# Patient Record
Sex: FEMALE | HISPANIC OR LATINO | Employment: FULL TIME | URBAN - METROPOLITAN AREA
[De-identification: names, ages, dates, MRNs, and addresses within clinical notes are randomized per-mention and may not be internally consistent; named-entity substitution may affect disease eponyms.]

---

## 2022-01-10 ENCOUNTER — OFFICE VISIT (OUTPATIENT)
Dept: URGENT CARE | Facility: CLINIC | Age: 37
End: 2022-01-10
Payer: COMMERCIAL

## 2022-01-10 ENCOUNTER — HOSPITAL ENCOUNTER (OUTPATIENT)
Facility: MEDICAL CENTER | Age: 37
End: 2022-01-10
Attending: PHYSICIAN ASSISTANT
Payer: COMMERCIAL

## 2022-01-10 VITALS
HEART RATE: 82 BPM | RESPIRATION RATE: 16 BRPM | HEIGHT: 63 IN | BODY MASS INDEX: 18.96 KG/M2 | DIASTOLIC BLOOD PRESSURE: 74 MMHG | OXYGEN SATURATION: 98 % | WEIGHT: 107 LBS | SYSTOLIC BLOOD PRESSURE: 120 MMHG | TEMPERATURE: 99 F

## 2022-01-10 DIAGNOSIS — R52 BODY ACHES: ICD-10-CM

## 2022-01-10 PROCEDURE — U0003 INFECTIOUS AGENT DETECTION BY NUCLEIC ACID (DNA OR RNA); SEVERE ACUTE RESPIRATORY SYNDROME CORONAVIRUS 2 (SARS-COV-2) (CORONAVIRUS DISEASE [COVID-19]), AMPLIFIED PROBE TECHNIQUE, MAKING USE OF HIGH THROUGHPUT TECHNOLOGIES AS DESCRIBED BY CMS-2020-01-R: HCPCS

## 2022-01-10 PROCEDURE — U0005 INFEC AGEN DETEC AMPLI PROBE: HCPCS

## 2022-01-10 PROCEDURE — 99203 OFFICE O/P NEW LOW 30 MIN: CPT | Performed by: PHYSICIAN ASSISTANT

## 2022-01-10 RX ORDER — LEVOTHYROXINE SODIUM 0.12 MG/1
TABLET ORAL
COMMUNITY
Start: 2021-05-07

## 2022-01-10 ASSESSMENT — ENCOUNTER SYMPTOMS
CHILLS: 1
SORE THROAT: 1
MYALGIAS: 1
FEVER: 0
BODY ACHES: 1

## 2022-01-10 NOTE — PROGRESS NOTES
"  Subjective:   Jessenia Christine is a 36 y.o. female who presents today with   Chief Complaint   Patient presents with   • Nasal Congestion     dull headache/pressure,chills,bodyaches, sore throat, earache     Generalized Body Aches  This is a new problem. Episode onset: 4 days. The problem occurs constantly. The problem has been unchanged. Associated symptoms include chills, myalgias and a sore throat. Pertinent negatives include no fever. She has tried acetaminophen for the symptoms. The treatment provided mild relief.     I personally donned proper PPE throughout visit today.     PMH:  has no past medical history on file.  MEDS:   Current Outpatient Medications:   •  levothyroxine (LEVOXYL) 125 MCG Tab, LEVOXYL 125 MCG TABS, Disp: , Rfl:   ALLERGIES: No Known Allergies  SURGHX: No past surgical history on file.  SOCHX:  reports that she has never smoked. She has never used smokeless tobacco.  FH: Reviewed with patient, not pertinent to this visit.     Review of Systems   Constitutional: Positive for chills. Negative for fever.   HENT: Positive for sore throat.    Musculoskeletal: Positive for myalgias.      Objective:   /74   Pulse 82   Temp 37.2 °C (99 °F) (Temporal)   Resp 16   Ht 1.6 m (5' 3\")   Wt 48.5 kg (107 lb)   SpO2 98%   BMI 18.95 kg/m²   Physical Exam  Vitals and nursing note reviewed.   Constitutional:       General: She is not in acute distress.     Appearance: Normal appearance. She is well-developed. She is not ill-appearing or toxic-appearing.   HENT:      Head: Normocephalic and atraumatic.      Right Ear: Hearing normal.      Left Ear: Hearing normal.      Mouth/Throat:      Pharynx: No oropharyngeal exudate or posterior oropharyngeal erythema.   Eyes:      Conjunctiva/sclera: Conjunctivae normal.   Cardiovascular:      Rate and Rhythm: Normal rate and regular rhythm.      Heart sounds: Normal heart sounds.   Pulmonary:      Effort: Pulmonary effort is normal.      Breath sounds: " Normal breath sounds. No stridor. No wheezing, rhonchi or rales.   Musculoskeletal:      Comments: Normal movement in all 4 extremities   Skin:     General: Skin is warm and dry.   Neurological:      Mental Status: She is alert.      Coordination: Coordination normal.   Psychiatric:         Mood and Affect: Mood normal.       Assessment/Plan:   Assessment    1. Body aches  - SARS-CoV-2 PCR (24 hour In-House): Collect NP swab in VTM; Future    Other orders  - levothyroxine (LEVOXYL) 125 MCG Tab; LEVOXYL 125 MCG TABS  Symptoms and presentation consistent with viral illness and we will rule out Covid at this time.  Vital signs are stable on exam today.  Discussed CDC guidelines including self isolation at home.   Patient encouraged to get plenty of rest, use OTC tylenol for pain/fever, and drink plenty of fluids.  Differential diagnosis, natural history, supportive care, and indications for immediate follow-up discussed.   Patient given instructions and understanding of medications and treatment.    If not improving in 3-5 days, F/U with PCP or return to  if symptoms worsen.    Patient agreeable to plan.      Please note that this dictation was created using voice recognition software. I have made every reasonable attempt to correct obvious errors, but I expect that there are errors of grammar and possibly content that I did not discover before finalizing the note.    Sonny Davenport PA-C

## 2022-01-11 DIAGNOSIS — R52 BODY ACHES: ICD-10-CM

## 2022-01-11 LAB
COVID ORDER STATUS COVID19: NORMAL
SARS-COV-2 RNA RESP QL NAA+PROBE: NOTDETECTED
SPECIMEN SOURCE: NORMAL

## 2024-04-04 ENCOUNTER — OFFICE VISIT (OUTPATIENT)
Dept: URGENT CARE | Facility: CLINIC | Age: 39
End: 2024-04-04
Payer: MEDICAID

## 2024-04-04 ENCOUNTER — HOSPITAL ENCOUNTER (OUTPATIENT)
Facility: MEDICAL CENTER | Age: 39
End: 2024-04-04
Attending: PHYSICIAN ASSISTANT
Payer: MEDICAID

## 2024-04-04 VITALS
OXYGEN SATURATION: 99 % | SYSTOLIC BLOOD PRESSURE: 132 MMHG | HEIGHT: 63 IN | DIASTOLIC BLOOD PRESSURE: 72 MMHG | BODY MASS INDEX: 18.95 KG/M2 | HEART RATE: 102 BPM | RESPIRATION RATE: 16 BRPM | TEMPERATURE: 97.2 F

## 2024-04-04 DIAGNOSIS — B96.89 BV (BACTERIAL VAGINOSIS): ICD-10-CM

## 2024-04-04 DIAGNOSIS — N94.6 DYSMENORRHEA: ICD-10-CM

## 2024-04-04 DIAGNOSIS — N76.0 BV (BACTERIAL VAGINOSIS): ICD-10-CM

## 2024-04-04 DIAGNOSIS — Z71.1 CONCERN ABOUT STD IN FEMALE WITHOUT DIAGNOSIS: ICD-10-CM

## 2024-04-04 LAB
APPEARANCE UR: CLEAR
BILIRUB UR STRIP-MCNC: NEGATIVE MG/DL
C TRACH DNA GENITAL QL NAA+PROBE: NEGATIVE
CANDIDA DNA VAG QL PROBE+SIG AMP: NEGATIVE
COLOR UR AUTO: YELLOW
G VAGINALIS DNA VAG QL PROBE+SIG AMP: POSITIVE
GLUCOSE UR STRIP.AUTO-MCNC: NEGATIVE MG/DL
KETONES UR STRIP.AUTO-MCNC: NEGATIVE MG/DL
LEUKOCYTE ESTERASE UR QL STRIP.AUTO: NEGATIVE
N GONORRHOEA DNA GENITAL QL NAA+PROBE: NEGATIVE
NITRITE UR QL STRIP.AUTO: NEGATIVE
PH UR STRIP.AUTO: 6 [PH] (ref 5–8)
POCT INT CON NEG: NEGATIVE
POCT INT CON POS: POSITIVE
POCT URINE PREGNANCY TEST: NEGATIVE
PROT UR QL STRIP: NEGATIVE MG/DL
RBC UR QL AUTO: NORMAL
SP GR UR STRIP.AUTO: 1.02
SPECIMEN SOURCE: NORMAL
T VAGINALIS DNA VAG QL PROBE+SIG AMP: NEGATIVE
UROBILINOGEN UR STRIP-MCNC: 0.2 MG/DL

## 2024-04-04 PROCEDURE — 87480 CANDIDA DNA DIR PROBE: CPT

## 2024-04-04 PROCEDURE — 87491 CHLMYD TRACH DNA AMP PROBE: CPT

## 2024-04-04 PROCEDURE — 81025 URINE PREGNANCY TEST: CPT | Performed by: PHYSICIAN ASSISTANT

## 2024-04-04 PROCEDURE — 87591 N.GONORRHOEAE DNA AMP PROB: CPT

## 2024-04-04 PROCEDURE — 87660 TRICHOMONAS VAGIN DIR PROBE: CPT

## 2024-04-04 PROCEDURE — 87510 GARDNER VAG DNA DIR PROBE: CPT

## 2024-04-04 PROCEDURE — 3075F SYST BP GE 130 - 139MM HG: CPT | Performed by: PHYSICIAN ASSISTANT

## 2024-04-04 PROCEDURE — 99214 OFFICE O/P EST MOD 30 MIN: CPT | Mod: 25 | Performed by: PHYSICIAN ASSISTANT

## 2024-04-04 PROCEDURE — 3078F DIAST BP <80 MM HG: CPT | Performed by: PHYSICIAN ASSISTANT

## 2024-04-04 PROCEDURE — 81002 URINALYSIS NONAUTO W/O SCOPE: CPT | Performed by: PHYSICIAN ASSISTANT

## 2024-04-04 RX ORDER — METRONIDAZOLE 500 MG/1
500 TABLET ORAL 2 TIMES DAILY
Qty: 14 TABLET | Refills: 0 | Status: SHIPPED | OUTPATIENT
Start: 2024-04-04 | End: 2024-04-11

## 2024-04-04 ASSESSMENT — ENCOUNTER SYMPTOMS
CONSTIPATION: 0
DIARRHEA: 0
VOMITING: 0
FEVER: 0
NAUSEA: 0
ABDOMINAL PAIN: 0
CHILLS: 0

## 2024-04-04 NOTE — RESULT ENCOUNTER NOTE
Hi Jerry,    Your testing was positive for bacterial vaginosis. I sent in an antibiotic for you. Feel better soon!    Kind regards,  Boo

## 2024-04-04 NOTE — PROGRESS NOTES
"Subjective:   Jessenia Christine is a 39 y.o. female who presents for Abdominal Pain and Other (STI check )        Patient goes by Jerry.    Patient presents with concerns of possible STI.  States that her partner is experiencing burning with urination.  She is not having any vaginal pain or abnormal discharge.  No genital lesions.  Patient states that she is experiencing more painful menstrual cycles.  She is having some diarrhea and nausea during her cycles as well.  Due to her history of Graves' disease her cycles were previously absent or mild, so she feels that she does not have a good baseline for comparison.  Last pelvic exam was 3 years ago-no history of abnormal Paps.  Not currently established with gynecology.  Patient is currently feeling well-no abdominal pain, nausea, vomiting, fevers, chills.        Review of Systems   Constitutional:  Negative for chills and fever.   Gastrointestinal:  Negative for abdominal pain, constipation, diarrhea, nausea and vomiting.   All other systems reviewed and are negative.      PMH:  has no past medical history on file.  MEDS:   Current Outpatient Medications:     levothyroxine (LEVOXYL) 125 MCG Tab, LEVOXYL 125 MCG TABS, Disp: , Rfl:   ALLERGIES:   Allergies   Allergen Reactions    Aspirin     Soybean Allergy Rash     SURGHX: History reviewed. No pertinent surgical history.  SOCHX:  reports that she has never smoked. She has never used smokeless tobacco.  FH: Family history was reviewed, no pertinent findings to report   Objective:   /72   Pulse (!) 102   Temp 36.2 °C (97.2 °F)   Resp 16   Ht 1.6 m (5' 3\")   SpO2 99%   BMI 18.95 kg/m²   Physical Exam  Vitals reviewed.   Constitutional:       General: She is not in acute distress.     Appearance: Normal appearance. She is well-developed. She is not toxic-appearing.   HENT:      Head: Normocephalic and atraumatic.      Right Ear: External ear normal.      Left Ear: External ear normal.      Nose: Nose normal. "   Cardiovascular:      Rate and Rhythm: Normal rate and regular rhythm.      Heart sounds: Normal heart sounds, S1 normal and S2 normal.   Pulmonary:      Effort: Pulmonary effort is normal. No respiratory distress.      Breath sounds: Normal breath sounds. No stridor. No decreased breath sounds, wheezing, rhonchi or rales.   Abdominal:      General: Abdomen is flat.      Tenderness: There is no abdominal tenderness. There is no right CVA tenderness, left CVA tenderness, guarding or rebound.   Skin:     General: Skin is dry.   Neurological:      Comments: Alert and oriented.    Psychiatric:         Speech: Speech normal.         Behavior: Behavior normal.           Assessment/Plan:   1. Dysmenorrhea  - POCT Urinalysis  - POCT Pregnancy  - VAGINAL PATHOGENS DNA PANEL; Future  - Chlamydia/GC, PCR (Genital/Anal swab); Future  - Referral to Gynecology  - HIV AG/AB COMBO ASSAY SCREENING; Future  - T.PALLIDUM AB NANCY (SCREENING)    2. Concern about STD in female without diagnosis  - HIV AG/AB COMBO ASSAY SCREENING; Future  - T.PALLIDUM AB NANCY (SCREENING)    Testing is pending.  I will contact patient via [employer] with results and adjust treatment plan as indicated.  Patient also referred to gynecology for routine medical care.  Return precautions reviewed.

## 2024-09-09 ENCOUNTER — HOSPITAL ENCOUNTER (OUTPATIENT)
Facility: MEDICAL CENTER | Age: 39
End: 2024-09-09
Attending: NURSE PRACTITIONER
Payer: MEDICAID

## 2024-09-09 ENCOUNTER — OFFICE VISIT (OUTPATIENT)
Dept: URGENT CARE | Facility: CLINIC | Age: 39
End: 2024-09-09
Payer: MEDICAID

## 2024-09-09 VITALS
DIASTOLIC BLOOD PRESSURE: 80 MMHG | RESPIRATION RATE: 20 BRPM | WEIGHT: 104 LBS | SYSTOLIC BLOOD PRESSURE: 136 MMHG | OXYGEN SATURATION: 99 % | BODY MASS INDEX: 18.43 KG/M2 | HEART RATE: 90 BPM | HEIGHT: 63 IN | TEMPERATURE: 98 F

## 2024-09-09 DIAGNOSIS — F40.9 FEAR FOR PERSONAL SAFETY: ICD-10-CM

## 2024-09-09 DIAGNOSIS — N89.8 VAGINAL DISCHARGE: ICD-10-CM

## 2024-09-09 DIAGNOSIS — Z00.00 HEALTH CARE MAINTENANCE: ICD-10-CM

## 2024-09-09 DIAGNOSIS — R52 GENERALIZED BODY ACHES: ICD-10-CM

## 2024-09-09 DIAGNOSIS — F22 PARANOIA (HCC): ICD-10-CM

## 2024-09-09 DIAGNOSIS — J45.20 MILD INTERMITTENT ASTHMA WITHOUT COMPLICATION: ICD-10-CM

## 2024-09-09 DIAGNOSIS — Z91.09 ENVIRONMENTAL ALLERGIES: ICD-10-CM

## 2024-09-09 PROCEDURE — 87660 TRICHOMONAS VAGIN DIR PROBE: CPT

## 2024-09-09 PROCEDURE — 87480 CANDIDA DNA DIR PROBE: CPT

## 2024-09-09 PROCEDURE — 3075F SYST BP GE 130 - 139MM HG: CPT | Performed by: NURSE PRACTITIONER

## 2024-09-09 PROCEDURE — 3079F DIAST BP 80-89 MM HG: CPT | Performed by: NURSE PRACTITIONER

## 2024-09-09 PROCEDURE — 87510 GARDNER VAG DNA DIR PROBE: CPT

## 2024-09-09 PROCEDURE — 99214 OFFICE O/P EST MOD 30 MIN: CPT | Performed by: NURSE PRACTITIONER

## 2024-09-09 RX ORDER — ALBUTEROL SULFATE 90 UG/1
2 INHALANT RESPIRATORY (INHALATION) EVERY 6 HOURS PRN
Qty: 8.5 G | Refills: 0 | Status: SHIPPED | OUTPATIENT
Start: 2024-09-09

## 2024-09-09 NOTE — PROGRESS NOTES
"Verbal consent was acquired by the patient to use [employer] ambient listening note generation during this visit     Date: 09/09/24        Chief Complaint   Patient presents with    Chills     X 5 days, fever, body aches, pt states she has not been able to eat since Wednesday           History of Present Illness  The patient is a 39-year-old female who presents to the clinic with nausea, vomiting, inability to eat for the past several days, chills, and body aches.    She reports feeling unwell after outdoor activities, experiencing symptoms such as body aches and chills. She does not have a sore throat or cough but mentions a few asthma episodes and excessive sweating. She recalls a week-long illness following a 10-mile walk on 06/04/2024. She has previously consulted an allergist and is considering another visit due to her work environment. She does not currently have a primary care physician or an inhaler at home. She has been using marijuana for symptom management.  She believes her symptoms are due to sun exposure.     She has been in a relationship for 5.5 years and became sexually active in 2020. She was diagnosed with bacterial vaginosis (BV) twice, which she believes was transmitted by her partner. Despite her partner's negative test results, she experienced a \"spike\" in her BV levels. She reports no vaginal itching, irritation, or change in discharge but notes a slight change in the color of her menstrual blood.    She has not been eating much recently but reports no nausea, vomiting, or diarrhea.  She states that she has not eating much because \"what is food \".  This has been a struggle with her for quite some time finding types of food that can fuel her body but does not cause a reaction.  She has been eating some cheese which has been helpful.    She does note previous workplace stress.  She states everywhere she goes that she encounters predators.  She states that every workplace she has worked she " encounters predators.  She states even just walking down the street she will encounter predators she reports these predators are sexual partners.  She states her significant other notes the pressures as well.  She denies any history of sexual abuse.  She used to have a therapist but unfortunately the therapist left the practice quite sometime ago.  She states that her mental health is great currently.  She states that marijuana has been helping her with her symptoms.   She has a history of thyroid issues and continues to take her thyroid medication.          ROS:    No severe shortness of breath   No Cardiac like chest pain, as discussed   As otherwise stated in HPI    Medical/SX/ Social History:  Reviewed per chart    Pertinent Medications:    Current Outpatient Medications on File Prior to Visit   Medication Sig Dispense Refill    levothyroxine (LEVOXYL) 125 MCG Tab LEVOXYL 125 MCG TABS       No current facility-administered medications on file prior to visit.        Allergies:    Aspirin and Soybean allergy     Problem list, medications, and allergies reviewed by myself today in Epic     Physical Exam:    Vitals:    09/09/24 1134   BP: 136/80   Pulse: 90   Resp: 20   Temp: 36.7 °C (98 °F)   SpO2: 99%             Physical Exam  Constitutional:       General: She is not in acute distress.     Appearance: Normal appearance. She is well-developed and normal weight. She is not ill-appearing, toxic-appearing or diaphoretic.   HENT:      Head: Normocephalic and atraumatic.      Nose: Nose normal.      Mouth/Throat:      Mouth: Mucous membranes are moist.   Eyes:      General: Lids are normal. Gaze aligned appropriately. No allergic shiner or scleral icterus.     Extraocular Movements: Extraocular movements intact.      Conjunctiva/sclera: Conjunctivae normal.   Cardiovascular:      Rate and Rhythm: Normal rate and regular rhythm.      Pulses:           Radial pulses are 2+ on the right side and 2+ on the left side.       Heart sounds: Normal heart sounds.   Pulmonary:      Effort: Pulmonary effort is normal.      Breath sounds: Normal breath sounds and air entry. No decreased breath sounds, wheezing, rhonchi or rales.   Abdominal:      General: Abdomen is flat. Bowel sounds are normal.      Palpations: Abdomen is soft.      Tenderness: There is no abdominal tenderness.   Musculoskeletal:      Right lower leg: No edema.      Left lower leg: No edema.   Skin:     General: Skin is warm.      Capillary Refill: Capillary refill takes less than 2 seconds.      Coloration: Skin is not cyanotic or pale.      Comments: Scabbing on face scattered    Neurological:      Mental Status: She is alert and oriented to person, place, and time.      Gait: Gait is intact.   Psychiatric:         Attention and Perception: Attention normal.         Mood and Affect: Mood is elated.         Speech: Speech normal.         Behavior: Behavior is hyperactive. Behavior is cooperative.         Thought Content: Thought content is paranoid. Thought content does not include homicidal or suicidal ideation.      Comments: Grandiose mood             Diagnostics:    Vaginal pathogen swab pending    Medical Decision making and plan :  I personally reviewed prior external notes and test results pertinent to today's visit. Pt is clinically stable at today's acute urgent care visit.  Patient appears nontoxic with no acute distress noted. Appropriate for outpatient care at this time.    Pleasant 39 y.o. female presented clinic with:     Assessment & Plan  1. Bacterial Vaginosis.  She reports a history of bacterial vaginosis with a recent spike in symptoms. A vaginal swab test will be conducted today to confirm the diagnosis. Education was provided regarding the nature of bacterial vaginosis, emphasizing that it is not a sexually transmitted infection and can be caused by factors such as swimming or sexual activity.  Advises an overgrowth of normal bacteria in the vaginal  area pending the results of the swab test, appropriate antibiotic therapy may be initiated.    2. Asthma.  She has a history of asthma and reports recent episodes exacerbated by outdoor activities and smoke exposure. An inhaler will be prescribed to manage her asthma symptoms. She is advised to stay out of the sun and avoid smoke exposure. Hydration and rest are recommended.    3.  Decreased appetite  She reports nausea, vomiting, and inability to eat for the past several days. She is advised to hydrate at home and rest. Small, frequent meals and electrolyte replacement drinks are recommended.  No signs of acute abdomen.  Likely psychogenic    4. Thyroid Disorder.  She has a history of thyroid disorder and reports a recent lapse in medication due to losing her endocrinologist. She is advised to follow up with a new primary care physician and endocrinologist to manage her thyroid condition.  She has currently been back on her medication for 2 weeks    5. Mental Health.  She reports a history of therapy for work-related stress and safety concerns. A referral to a new therapist will be made to address her ongoing mental health needs.  While patient is happy and elated during clinic visit do have concerns for possible paranoia versus delusions versus phobia of predators.  She is not at risk to harming herself or others.     6. Allergy Referral.  She has a history of allergies and reports symptoms exacerbated by outdoor activities. A referral to an allergist will be made for further evaluation and management.        Shared decision-making was utilized with patient for treatment plan. Medication discussed included indication for use and the potential benefits and side effects. Education was provided regarding the aforementioned assessments.  Differential Diagnosis, natural history, and supportive care discussed. All of the patient's questions were answered to their satisfaction at the time of discharge. Patient was  encouraged to monitor symptoms closely. Those signs and symptoms which would warrant concern and mandate seeking a higher level of service through the emergency department discussed at length.  Patient stated agreement and understanding of this plan of care.    Disposition:  Home in stable condition       35 min times spent with patient including documenting.     Voice Recognition Disclaimer:  Portions of this document were created using voice recognition software and [employer] technology provided by [employer]. The software does have a chance of producing errors of grammar and possibly content. I have made every reasonable attempt to correct obvious errors, but there may be errors of grammar and possibly content that I did not discover before finalizing the  documentation.    IVAN Baker.

## 2024-09-10 LAB
CANDIDA DNA VAG QL PROBE+SIG AMP: NEGATIVE
G VAGINALIS DNA VAG QL PROBE+SIG AMP: NEGATIVE
T VAGINALIS DNA VAG QL PROBE+SIG AMP: NEGATIVE

## 2024-12-10 ENCOUNTER — TELEPHONE (OUTPATIENT)
Dept: OBGYN | Facility: CLINIC | Age: 39
End: 2024-12-10
Payer: MEDICAID

## 2025-02-12 ENCOUNTER — APPOINTMENT (OUTPATIENT)
Dept: RADIOLOGY | Facility: IMAGING CENTER | Age: 40
End: 2025-02-12
Payer: COMMERCIAL

## 2025-02-12 ENCOUNTER — OCCUPATIONAL MEDICINE (OUTPATIENT)
Dept: URGENT CARE | Facility: CLINIC | Age: 40
End: 2025-02-12
Payer: COMMERCIAL

## 2025-02-12 VITALS
SYSTOLIC BLOOD PRESSURE: 126 MMHG | WEIGHT: 103 LBS | HEART RATE: 63 BPM | TEMPERATURE: 97.7 F | HEIGHT: 63 IN | DIASTOLIC BLOOD PRESSURE: 72 MMHG | BODY MASS INDEX: 18.25 KG/M2 | RESPIRATION RATE: 18 BRPM | OXYGEN SATURATION: 99 %

## 2025-02-12 DIAGNOSIS — S87.82XA CRUSHING INJURY OF LEFT LOWER LEG, INITIAL ENCOUNTER: ICD-10-CM

## 2025-02-12 DIAGNOSIS — S80.12XA CONTUSION OF LEFT LOWER LEG, INITIAL ENCOUNTER: ICD-10-CM

## 2025-02-12 PROCEDURE — 73590 X-RAY EXAM OF LOWER LEG: CPT | Mod: TC,LT | Performed by: RADIOLOGY

## 2025-02-12 PROCEDURE — 3078F DIAST BP <80 MM HG: CPT

## 2025-02-12 PROCEDURE — 99213 OFFICE O/P EST LOW 20 MIN: CPT

## 2025-02-12 PROCEDURE — 3074F SYST BP LT 130 MM HG: CPT

## 2025-02-12 NOTE — LETTER
PHYSICIAN’S AND CHIROPRACTIC PHYSICIAN'S   PROGRESS REPORT   CERTIFICATION OF DISABILITY Claim Number:     Social Security Number:    Patient’s Name: Jessenia Christine Date of Injury: 2/10/2025   Employer: [employer] Name of MCO (if applicable):      Patient’s Job Description/Occupation:        Previous Injuries/Diseases/Surgeries Contributing to the Condition:  Denies      Diagnosis: (S87.82XA) Crushing injury of left lower leg, initial encounter  (S80.12XA) Contusion of left lower leg, initial encounter      Related to the Industrial Injury? 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  She has had pain with ambulation, she states she has been unable to walk.  She reports swelling, bruising.  Denies weakness, numbness/tingling sensation, reduced range of motion.  Denies pain in knee, ankle. 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 She also reports she has a history of requiring extensive periods of time to recover from bruising and injuries. Denies second job.       Objective Medical Findings: Physical Exam  Vitals and nursing note reviewed.   Constitutional:       General: She is not in acute distress.     Appearance: Normal appearance. She is not ill-appearing or diaphoretic.   HENT:      Head: Normocephalic.   Cardiovascular:      Rate and Rhythm: Normal rate and regular rhythm.      Heart sounds: Normal heart sounds.   Pulmonary:      Effort: Pulmonary effort is normal.      Breath sounds: Normal breath sounds.   Musculoskeletal:         General: Tenderness and signs of injury present. No swelling or deformity.      Right knee: Normal.      Left knee: Normal.       Right lower leg: Normal.      Left lower leg: Swelling, tenderness (mid tibial tenderness and ecchymosis present) and bony tenderness present. No deformity or lacerations. No edema.      Right ankle: Normal.      Left ankle: Normal.      Right foot: Normal.      Left foot: Normal capillary refill. Normal pulse.        Legs:     Skin:     General: Skin is warm and dry.      Capillary Refill: Capillary refill takes less than 2 seconds.      Findings: Bruising present.   Neurological:      Mental Status: She is alert and oriented to person, place, and time.      Gait: Gait abnormal (Sitting in WC).          None - Discharged                         Stable  No                 Ratable  No        Generally Improved                         Condition Worsened                  Condition Same  May Have Suffered a Permanent Disability No     Treatment Plan:    D39 and C4 completed today.  Per radiology impression, no evidence of fracture or dislocation is present.  Recommend rest, elevation, application of ice packs, NSAID/time per package instructions.  Patient likely contused the anterior lower portion of her leg.   Work restrictions: No pushing, pulling, lifting, carrying of objects weighing more than 10 pounds with the left leg.  Follow-up: 1 week, anticipate MMI         No Change in Therapy                  PT/OT Prescribed                      Medication May be Used While Working        Case Management                          PT/OT Discontinued    Consultation    Further Diagnostic Studies:    Prescription(s)                 Released to FULL DUTY /No Restrictions on (Date):       Certified TOTALLY TEMPORARILY DISABLED (Indicate Dates) From:   To:    X  Released to RESTRICTED/Modified Duty on (Date): From: 2/12/2025 To: 2/19/2025  Restrictions Are:         No Sitting    No Standing    No Pulling Other: No pushing, pulling, lifting, carrying of objects more than 10 pounds with the left leg       No Bending at Waist      No Stooping     No Lifting        No Carrying     No Walking Lifting Restricted to (lbs.):          No Pushing        No Climbing     No Reaching Above Shoulders       Date of Next Visit:   Date of this Exam: 2/12/2025 Physician/Chiropractic Physician Name: JOHN Sandy Physician/Chiropractic Physician Signature:  Tyrese Carmen DO [facility]:  [address] [place] [address], [address] [place], [place] [address] - Telephone [phone] [place]:  [address] [place] [address], [address] [place], [place] [address] - Telephone [phone]    [email or web address]  D-39 (Rev. 10/24)

## 2025-02-12 NOTE — LETTER
"    EMPLOYEE’S CLAIM FOR COMPENSATION/ REPORT OF INITIAL TREATMENT  FORM C-4  PLEASE TYPE OR PRINT    EMPLOYEE’S CLAIM - PROVIDE ALL INFORMATION REQUESTED   First Name                    AMRIT Ruelas                  Last Name  Orvis Birthdate                    1985                Sex  Female Claim Number (Insurer’s Use Only)     Home Address  [address] Age  39 y.o. Height  1.6 m (5' 3\") Weight  46.7 kg (103 lb) Social Security Number     [facility] Zip  [address] Telephone  There are no phone numbers on file.   Mailing Address  [address] [facility] Zip  [address] Primary Language Spoken  English    INSURER   THIRD-PARTY   [employer]   Employee's Occupation (Job Title) When Injury or Occupational Disease Occurred      Employer's Name/Company Name   [employer]  Telephone      Office Mail Address (Number and Street)       Date of Injury (if applicable) 2/10/2025               Hours Injury (if applicable)  1:30 PM Date Employer Notified  2/10/2025 Last Day of Work after Injury or Occupational Disease  2/10/2025 Supervisor to Whom Injury Reported  Donaldo   Address or Location of Accident (if applicable)  Work [1]   What were you doing at the time of accident? (if applicable)  pushing round trash cAn on maxi (assembly of trash can)    How did this injury or occupational disease occur? (Be specific and answer in detail. Use additional sheet if necessary)  while pushing the trAsh cAN on wheels, trash cAN fell off wheels. The top of trash cAN hit my shin. [employer] twice   If you believe that you have an occupational disease, when did you first have knowledge of the disability and its relationship to your employment?  N/A Witnesses to the Accident (if applicable)  none      Nature of Injury or Occupational Disease  Contusion  Part(s) of Body Injured or " Affected  Lower Leg (L) N/A N/A    I CERTIFY THAT THE ABOVE IS TRUE AND CORRECT TO T HE BEST OF MY KNOWLEDGE AND THAT I HAVE PROVIDED THIS INFORMATION IN ORDER TO OBTAIN THE BENEFITS OF [place]CC[place], OR [place]).  I HEREBY AUTHORIZE ANY PHYSICIAN, CHIROPRACTOR, SURGEON, PRACTITIONER OR ANY OTHER PERSON, ANY HOSPITAL, INCLUDING [facility] OR [facility], ANY  MEDICAL SERVICE ORGANIZATION, ANY INSURANCE COMPANY, OR OTHER INSTITUTION OR ORGANIZATION TO RELEASE TO EACH OTHER, ANY MEDICAL OR OTHER INFORMATION, INCLUDING BENEFITS PAID OR PAYABLE, PERTINENT TO THIS INJURY OR DISEASE, EXCEPT INFORMATION RELATIVE TO DIAGNOSIS, TREATMENT AND/OR COUNSELING FOR AIDS, PSYCHOLOGICAL CONDITIONS, ALCOHOL OR CONTROLLED SUBSTANCES, FOR WHICH I MUST GIVE SPECIFIC AUTHORIZATION.  A PHOTOSTAT OF THIS AUTHORIZATION SHALL BE VALID AS THE ORIGINAL.     Date 2/12/25   Place [facility] Employee’s Original or  *Electronic Signature   THIS REPORT MUST BE COMPLETED AND MAILED WITHIN 3 WORKING DAYS OF TREATMENT   Place  [facility]    Name of Facility  [facility]   Date 2/12/2025 Diagnosis and Description of Injury or Occupational Disease  (S87.82XA) Crushing injury of left lower leg, initial encounter  (S80.12XA) Contusion of left lower leg, initial encounter  Diagnoses of Crushing injury of left lower leg, initial encounter and Contusion of left lower leg, initial encounter were pertinent to this visit. Is there evidence that the injured employee was under the influence of alcohol and/or another controlled substance at the time of accident?  []No  [] Yes (if yes, please explain)   Hour 6:10 PM  No   Treatment: Recommend rest, elevation, application of ice packs, NSAID/time per package instructions.  Patient likely contused the anterior lower portion of her leg.     Have you advised the patient to remain off work five days or more?    [] Yes  [] No        No           If yes, indicate dates: From   To      If no, is the injured employee capable of: [] full duty No   [] modified duty Yes  If yes to modified duty, specify any limitations / restrictions:   No pushing, pulling, lifting, carrying of objects weighing more than 10 pounds with the left leg   X-Ray Findings: Negative    From information given by the employee, together with medical evidence, can you directly connect this injury or occupational disease as job incurred?  []Yes   [] No Yes    Is additional medical care by a physician indicated? []Yes [] No  Yes    Do you know of any previous injury or disease contributing to this condition or occupational disease? []Yes [] No (Explain if yes)                          No   Date  2/12/2025 Print Health Care Provider’s Name  JOHN Sandy I certify that the employer’s copy of  this form was delivered to the employer on:   Address  [address] [place] [address] INSURER'S USE ONLY                       [facility]  [id number] Provider’s Tax ID Number  [id number]   Telephone  Dept: [phone]    Health Care Provider’s Original or Electronic Signature  e-SignPEHARRIET DAVIS Degree (MD,DO, DC,PA-C,APRN)  APRN  Choose (if applicable)      ORIGINAL - TREATING HEALTHCARE PROVIDER PAGE 2 - INSURER/TPA PAGE 3 - EMPLOYER PAGE 4 - EMPLOYEE             Form C-4 (rev.08/23)

## 2025-02-13 NOTE — PROGRESS NOTES
Subjective:   Jessenia Christine is a 39 y.o. 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  She has had pain with ambulation, she states she has been unable to walk.  She reports swelling, bruising.  Denies weakness, numbness/tingling sensation, reduced range of motion.  Denies pain in knee, ankle. 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 She also reports she has a history of requiring extensive periods of time to recover from bruising and injuries. Denies second job.     ROS As above in HPI    Medications:    Current Outpatient Medications on File Prior to Visit   Medication Sig Dispense Refill    albuterol 108 (90 Base) MCG/ACT Aero Soln inhalation aerosol Inhale 2 Puffs every 6 hours as needed for Shortness of Breath. 8.5 g 0    levothyroxine (LEVOXYL) 125 MCG Tab LEVOXYL 125 MCG TABS       No current facility-administered medications on file prior to visit.        Allergies:   Bee venom, Aspirin, Ibuprofen, Pineapple, and Soybean allergy    Problem List:   Patient Active Problem List   Diagnosis    Allergic rhinitis    Asbestos exposure    Asthma    Congenital von Willebrand's disease type I (HCC)    PMS (premenstrual syndrome)    Postablative hypothyroidism    S/P thyroidectomy        Surgical History:  No past surgical history on file.    Past Social Hx:   Social History     Tobacco Use    Smoking status: Never    Smokeless tobacco: Never   Vaping Use    Vaping status: Never Used   Substance Use Topics    Alcohol use: Never    Drug use: Never     "      Problem list, medications, and allergies reviewed by myself today in Epic.     Objective:     /72   Pulse 63   Temp 36.5 °C (97.7 °F) (Temporal)   Resp 18   Ht 1.6 m (5' 3\")   Wt 46.7 kg (103 lb)   SpO2 99%   BMI 18.25 kg/m²     Physical Exam  Vitals and nursing note reviewed.   Constitutional:       General: She is not in acute distress.     Appearance: Normal appearance. She is not ill-appearing or diaphoretic.   HENT:      Head: Normocephalic.   Cardiovascular:      Rate and Rhythm: Normal rate and regular rhythm.      Heart sounds: Normal heart sounds.   Pulmonary:      Effort: Pulmonary effort is normal.      Breath sounds: Normal breath sounds.   Musculoskeletal:         General: Tenderness and signs of injury present. No swelling or deformity.      Right knee: Normal.      Left knee: Normal.      Right lower leg: Normal.      Left lower leg: Swelling, tenderness (mid tibial tenderness and ecchymosis present) and bony tenderness present. No deformity or lacerations. No edema.      Right ankle: Normal.      Left ankle: Normal.      Right foot: Normal.      Left foot: Normal capillary refill. Normal pulse.        Legs:    Skin:     General: Skin is warm and dry.      Capillary Refill: Capillary refill takes less than 2 seconds.      Findings: Bruising present.   Neurological:      Mental Status: She is alert and oriented to person, place, and time.      Gait: Gait abnormal (Sitting in WC).         Assessment/Plan:     DX-TIBIA AND FIBULA LEFT 02/12/2025    Narrative  2/12/2025 6:59 PM    HISTORY/REASON FOR EXAM:  Work related injury, had a trash can, approx wt 100 lb, fall onto her anterior lower leg, unable to walk in WC      TECHNIQUE/EXAM DESCRIPTION AND NUMBER OF VIEWS:  2 views of the LEFT tibia and fibula.    COMPARISON:  None    FINDINGS:  Bone mineralization is normal.  There is no evidence of fracture or dislocation.  Soft tissues are normal.    Impression  No evidence of fracture or " dislocation.      Diagnosis and associated orders:   1. Crushing injury of left lower leg, initial encounter  - DX-TIBIA AND FIBULA LEFT; Future    2. Contusion of left lower leg, initial encounter        Comments/MDM:       D39 and C4 completed today.  Per radiology impression, no evidence of fracture or dislocation is present.  Recommend rest, elevation, application of ice packs, NSAID/time per package instructions.  Patient likely contused the anterior lower portion of her leg.   Work restrictions: No pushing, pulling, lifting, carrying of objects weighing more than 10 pounds with the left leg.  Follow-up: 1 week, anticipate MMI       Return to clinic or go to ED if symptoms worsen or persist. Indications for ED discussed at length. Patient/Parent/Guardian voices understanding. Follow-up with your primary care provider in 3-5 days. Red flag symptoms discussed. All side effects of medication discussed including allergic response, GI upset, tendon injury, rash, sedation etc.    Please note that this dictation was created using voice recognition software. I have made a reasonable attempt to correct obvious errors, but I expect that there are errors of grammar and possibly content that I did not discover before finalizing the note.    This note was electronically signed by HALLEY Stubbs

## 2025-02-19 ENCOUNTER — OCCUPATIONAL MEDICINE (OUTPATIENT)
Dept: OCCUPATIONAL MEDICINE | Facility: CLINIC | Age: 40
End: 2025-02-19
Payer: COMMERCIAL

## 2025-02-19 VITALS
BODY MASS INDEX: 18.25 KG/M2 | HEART RATE: 72 BPM | DIASTOLIC BLOOD PRESSURE: 90 MMHG | SYSTOLIC BLOOD PRESSURE: 136 MMHG | TEMPERATURE: 98 F | WEIGHT: 103 LBS | RESPIRATION RATE: 14 BRPM | OXYGEN SATURATION: 100 % | HEIGHT: 63 IN

## 2025-02-19 DIAGNOSIS — S87.82XD CRUSHING INJURY OF LEFT LOWER LEG, SUBSEQUENT ENCOUNTER: ICD-10-CM

## 2025-02-19 PROCEDURE — 99213 OFFICE O/P EST LOW 20 MIN: CPT | Performed by: NURSE PRACTITIONER

## 2025-02-19 ASSESSMENT — PAIN SCALES - GENERAL: PAINLEVEL_OUTOF10: 4=SLIGHT-MODERATE PAIN

## 2025-02-19 NOTE — LETTER
PHYSICIAN’S AND CHIROPRACTIC PHYSICIAN'S   PROGRESS REPORT   CERTIFICATION OF DISABILITY Claim Number:     Social Security Number:    Patient’s Name: Jessenia Christine Date of Injury: 2/10/2025   Employer:  [employer] Name of MCO (if applicable):      Patient’s Job Description/Occupation:        Previous Injuries/Diseases/Surgeries Contributing to the Condition:         Diagnosis: (S87.82XD) Crushing injury of left lower leg, subsequent encounter      Related to the Industrial Injury? 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         Objective Medical Findings: Left lower leg: No  swelling. Positive for tenderness to the mid tibial and faint ecchymosis present and bony tenderness present. No deformity or lacerations. No wounds, signs and symptoms of infection, or erythema.  Distal neurovascular sensation and strength intact.  No gait abnormalities noted.  DTRs 2+.         None - Discharged                         Stable  Yes                 Ratable  No     X   Generally Improved                         Condition Worsened               X   Condition Same  May Have Suffered a Permanent Disability No     Treatment Plan:    Follow-up in 1 week, anticipate MMI at this time  Continue with OTC Tylenol/ibuprofen, ice/heat application, OTC topical ointment of your choosing, and gentle range of motion stretching as tolerated  Return to clinic sooner if needed for further evaluation and management of new or worsening symptoms         No Change in Therapy                  PT/OT Prescribed                      Medication May be Used While Working        Case Management                          PT/OT Discontinued    Consultation    Further Diagnostic Studies:    Prescription(s)                 Released to FULL DUTY /No  Restrictions on (Date):       Certified TOTALLY TEMPORARILY DISABLED (Indicate Dates) From:   To:    X  Released to RESTRICTED/Modified Duty on (Date): From: 2/19/2025 To:  2/26/2025  Restrictions Are:  Temporary      No Sitting    No Standing    No Pulling Other: No pushing, pulling, lifting, carrying of objects weighing more than 10 pounds with the left leg.       No Bending at Waist     No Stooping     No Lifting        No Carrying     No Walking Lifting Restricted to (lbs.):  < or = to 10 pounds       No Pushing        No Climbing     No Reaching Above Shoulders       Date of Next Visit:   2/26/25 AT 11:15 AM Date of this Exam: 2/19/2025 Physician/Chiropractic Physician Name: JOHN Caba Physician/Chiropractic Physician Signature:  Tyrese Carmen DO MPH     [place]:  [address] [place] [address], [address] [place], [place] [address] - Telephone [phone] [place]:  [address] [place] [address], [address] [place], [place] [address] - Telephone [phone]    [email or web address]  D-39 (Rev. 10/24)

## 2025-02-19 NOTE — PROGRESS NOTES
Subjective:     Jessenia Christine is a 39 y.o. 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        Patient states that she is starting to have some improvement of symptoms.  She occasionally has discomfort when she is walking for long dates of time.  She states initially she was unable to do any weightbearing without severe pain.  She has no numbness, tingling, or weakness in the joint.  She takes ibuprofen if needed for her symptoms.  She has been doing stretching exercises.  She is not as consistent with ice, heat, or OTC ointments because she does not like to use them but when she does it helps.  She has not returned to work since the incident.  Anticipate MMI at this next visit.  Plan of care discussed with patient.    ROS: All systems were reviewed on intake form, form was reviewed and signed. See scanned documents in media. Pertinent positives and negatives included in HPI.    PMH: No pertinent past medical history to this problem  MEDS: Medications were reviewed in Epic  ALLERGIES:   Allergies   Allergen Reactions    Bee Venom Anaphylaxis    Aspirin     Ibuprofen Unspecified     Other Reaction(s) from [facility]: thin blood    Pineapple Unspecified     Tongue swells with pineapple    Soybean Allergy Rash     SOCHX: Works as staff at [employer]   FH: No pertinent family history to this problem       Objective:     There were no vitals taken for this visit.    [unfilled]    Left lower leg: No  swelling. Positive for tenderness to the mid tibial and faint ecchymosis present and bony tenderness present. No deformity or lacerations. No wounds, signs and symptoms of infection, or erythema.  Distal neurovascular sensation and strength intact.  No gait abnormalities noted.   DTRs 2+.    Assessment/Plan:       1. Crushing injury of left lower leg, subsequent encounter       No pushing, pulling, lifting, carrying of objects weighing more than 10 pounds with the left leg.       Differential diagnosis, natural history, supportive care, and indications for immediate follow-up discussed.    Approximately 30 minutes were spent in reviewing notes, preparing for visit, obtaining history, exam and evaluation, patient counseling/education and post visit documentation/orders.

## 2025-02-26 ENCOUNTER — OCCUPATIONAL MEDICINE (OUTPATIENT)
Dept: OCCUPATIONAL MEDICINE | Facility: CLINIC | Age: 40
End: 2025-02-26
Payer: COMMERCIAL

## 2025-02-26 DIAGNOSIS — S87.82XD CRUSHING INJURY OF LEFT LOWER LEG, SUBSEQUENT ENCOUNTER: ICD-10-CM

## 2025-02-26 ASSESSMENT — ENCOUNTER SYMPTOMS
PSYCHIATRIC NEGATIVE: 1
CARDIOVASCULAR NEGATIVE: 1
TINGLING: 0
CONSTITUTIONAL NEGATIVE: 1
MYALGIAS: 0
RESPIRATORY NEGATIVE: 1
WEAKNESS: 0
SENSORY CHANGE: 0

## 2025-02-26 NOTE — LETTER
PHYSICIAN’S AND CHIROPRACTIC PHYSICIAN'S   PROGRESS REPORT   CERTIFICATION OF DISABILITY Claim Number:     Social Security Number:    Patient’s Name: Jessenia Christine Date of Injury: 2/10/2025   Employer:  [employer]  Name of MCO (if applicable):      Patient’s Job Description/Occupation:        Previous Injuries/Diseases/Surgeries Contributing to the Condition:         Diagnosis: (S87.82XD) Crushing injury of left lower leg, subsequent encounter      Related to the Industrial Injury? 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       Objective Medical Findings: Left lower leg: No  swelling.  Negative for tenderness to the mid tibial and faint ecchymosis present and bony tenderness present. No deformity or lacerations. No wounds, signs and symptoms of infection, or erythema.  Distal neurovascular sensation and strength intact.  No gait abnormalities noted.  DTRs 2+.         X   None - Discharged                         Stable  Yes                 Ratable  No     X   Generally Improved                         Condition Worsened                  Condition Same  May Have Suffered a Permanent Disability No     Treatment Plan:    Discharged/MMI  Released to full duty  Follow-up if needed         No Change in Therapy                  PT/OT Prescribed                      Medication May be Used While Working        Case Management                          PT/OT Discontinued    Consultation    Further Diagnostic Studies:    Prescription(s)               X  Released to FULL DUTY /No Restrictions on (Date):  2/26/2025    Certified TOTALLY TEMPORARILY DISABLED (Indicate Dates) From:   To:      Released to RESTRICTED/Modified Duty on (Date): From:   To:    Restrictions Are:         No Sitting    No Standing     No Pulling Other:         No Bending at Waist     No Stooping     No Lifting        No Carrying     No Walking Lifting Restricted to (lbs.):          No Pushing        No Climbing     No Reaching Above Shoulders       Date of Next Visit:   Discharged/MMI  Released to full duty  Follow-up if needed Date of this Exam: 2/26/2025 Physician/Chiropractic Physician Name: JOHN Caba Physician/Chiropractic Physician Signature:  Tyrese Carmen DO MPH     [place]:  [address] [place] [address], [address] [place], [place] [address] - Telephone [phone] [place]:  [address] [place] [address], [address] [place], [place] [address] - Telephone [phone]    [email or web address]  D-39 (Rev. 10/24)
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
## 2025-05-22 ENCOUNTER — HOSPITAL ENCOUNTER (EMERGENCY)
Facility: MEDICAL CENTER | Age: 40
End: 2025-05-22
Attending: EMERGENCY MEDICINE
Payer: COMMERCIAL

## 2025-05-22 ENCOUNTER — APPOINTMENT (OUTPATIENT)
Dept: RADIOLOGY | Facility: MEDICAL CENTER | Age: 40
End: 2025-05-22
Attending: EMERGENCY MEDICINE
Payer: COMMERCIAL

## 2025-05-22 VITALS
TEMPERATURE: 98.4 F | WEIGHT: 106.48 LBS | DIASTOLIC BLOOD PRESSURE: 90 MMHG | OXYGEN SATURATION: 98 % | RESPIRATION RATE: 16 BRPM | BODY MASS INDEX: 18.87 KG/M2 | HEIGHT: 63 IN | HEART RATE: 90 BPM | SYSTOLIC BLOOD PRESSURE: 142 MMHG

## 2025-05-22 DIAGNOSIS — S09.90XA CLOSED HEAD INJURY, INITIAL ENCOUNTER: ICD-10-CM
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
## 2025-05-22 DIAGNOSIS — S00.83XA CONTUSION OF FACE, INITIAL ENCOUNTER: ICD-10-CM

## 2025-05-22 PROCEDURE — 99283 EMERGENCY DEPT VISIT LOW MDM: CPT

## 2025-05-22 PROCEDURE — 70486 CT MAXILLOFACIAL W/O DYE: CPT
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
## 2025-05-27 ENCOUNTER — OCCUPATIONAL MEDICINE (OUTPATIENT)
Dept: URGENT CARE | Facility: CLINIC | Age: 40
End: 2025-05-27
Payer: COMMERCIAL

## 2025-05-27 VITALS
OXYGEN SATURATION: 97 % | RESPIRATION RATE: 17 BRPM | WEIGHT: 107 LBS | BODY MASS INDEX: 18.96 KG/M2 | DIASTOLIC BLOOD PRESSURE: 74 MMHG | SYSTOLIC BLOOD PRESSURE: 110 MMHG | HEART RATE: 80 BPM | HEIGHT: 63 IN | TEMPERATURE: 98.1 F

## 2025-05-27 DIAGNOSIS — S06.0X0A CONCUSSION WITHOUT LOSS OF CONSCIOUSNESS, INITIAL ENCOUNTER: ICD-10-CM

## 2025-05-27 PROCEDURE — 99214 OFFICE O/P EST MOD 30 MIN: CPT | Performed by: PHYSICIAN ASSISTANT

## 2025-05-27 PROCEDURE — 3078F DIAST BP <80 MM HG: CPT | Performed by: PHYSICIAN ASSISTANT

## 2025-05-27 PROCEDURE — 3074F SYST BP LT 130 MM HG: CPT | Performed by: PHYSICIAN ASSISTANT

## 2025-05-27 ASSESSMENT — VISUAL ACUITY: OU: 1

## 2025-05-27 NOTE — LETTER
PHYSICIAN’S AND CHIROPRACTIC PHYSICIAN'S   PROGRESS REPORT   CERTIFICATION OF DISABILITY Claim Number:     Social Security Number:    Patient’s Name: Jessenia Christine Date of Injury:    Employer:  [employer] Name of MCO (if applicable):      Patient’s Job Description/Occupation:        Previous Injuries/Diseases/Surgeries Contributing to the Condition:  No      Diagnosis: (S06.0X0A) Concussion without loss of consciousness, initial encounter      Related to the Industrial Injury? Yes     Explain:        Objective Medical Findings: Physical Exam  Vitals and nursing note reviewed.   Constitutional:       General: She is not in acute distress.     Appearance: Normal appearance. She is well-developed. She is not ill-appearing, toxic-appearing or diaphoretic.   HENT:      Head: Normocephalic. Contusion present. No raccoon eyes or Bates's sign.      Right Ear: Tympanic membrane, ear canal and external ear normal. No hemotympanum.      Left Ear: Tympanic membrane, ear canal and external ear normal. No hemotympanum.      Nose: Nose normal. No congestion or rhinorrhea.      Mouth/Throat:      Mouth: Mucous membranes are moist.      Pharynx: No oropharyngeal exudate or posterior oropharyngeal erythema.   Eyes:      General: Vision grossly intact.         Right eye: No discharge.         Left eye: No discharge.      Extraocular Movements: Extraocular movements intact.      Conjunctiva/sclera: Conjunctivae normal.      Pupils: Pupils are equal, round, and reactive to light.   Cardiovascular:      Rate and Rhythm: Normal rate and regular rhythm.      Pulses: Normal pulses.      Heart sounds: Normal heart sounds.   Pulmonary:      Effort: Pulmonary effort is normal. No respiratory distress.      Breath sounds: Normal breath sounds. No wheezing, rhonchi or rales.   Musculoskeletal:      Cervical back: Normal range of motion and neck supple.      Right lower leg: No edema.      Left lower leg: No edema.    Lymphadenopathy:      Cervical: No cervical adenopathy.   Skin:     General: Skin is warm and dry.   Neurological:      General: No focal deficit present.      Mental Status: She is alert and oriented to person, place, and time. Mental status is at baseline.      Cranial Nerves: No cranial nerve deficit.      Sensory: Sensation is intact. No sensory deficit.      Motor: Motor function is intact. No weakness.      Coordination: Coordination is intact. Coordination normal.      Gait: Gait normal.      Deep Tendon Reflexes: Reflexes normal.   Psychiatric:         Attention and Perception: Attention and perception normal.         Mood and Affect: Mood and affect normal.         Speech: Speech normal.         Behavior: Behavior normal. Behavior is cooperative.         Thought Content: Thought content normal.         Cognition and Memory: Cognition and memory normal.         Judgment: Judgment normal.          None - Discharged                         Stable  No                 Ratable  No        Generally Improved                         Condition Worsened                  Condition Same  May Have Suffered a Permanent Disability No     Treatment Plan:    Continue with OTC meds and rest         No Change in Therapy                  PT/OT Prescribed                      Medication May be Used While Working        Case Management                          PT/OT Discontinued    Consultation    Further Diagnostic Studies:    Prescription(s)               X  Released to FULL DUTY /No Restrictions on (Date):       Certified TOTALLY TEMPORARILY DISABLED (Indicate Dates) From:   To:    X  Released to RESTRICTED/Modified Duty on (Date): From: 5/27/2025 To: 5/31/2025  Restrictions Are:  Temporary      No Sitting    No Standing    No Pulling Other: No lifting, pushing, pulling.  Limited walking and exercise.  Desk duty recommended, no exertional activities       No Bending at Waist     No Stooping     No Lifting        No Carrying      No Walking Lifting Restricted to (lbs.):          No Pushing        No Climbing     No Reaching Above Shoulders       Date of Next Visit:  5/31/2025 Date of this Exam: 5/27/2025 Physician/Chiropractic Physician Name: Leif Estrada P.A.-C. Physician/Chiropractic Physician Signature:  Tyrese Carmen DO MPH     [place]:  [address] [place] [address], [address] [place], [place] [address] - Telephone [phone] [place]:  [address] [place] [address], [address] [place], [place] [address] - Telephone [phone]    [email or web address]  D-39 (Rev. 10/24)
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
## 2025-05-31 ENCOUNTER — OCCUPATIONAL MEDICINE (OUTPATIENT)
Dept: URGENT CARE | Facility: CLINIC | Age: 40
End: 2025-05-31
Payer: COMMERCIAL

## 2025-05-31 VITALS
DIASTOLIC BLOOD PRESSURE: 78 MMHG | WEIGHT: 107 LBS | HEART RATE: 72 BPM | OXYGEN SATURATION: 98 % | SYSTOLIC BLOOD PRESSURE: 118 MMHG | HEIGHT: 63 IN | BODY MASS INDEX: 18.96 KG/M2 | TEMPERATURE: 97.7 F | RESPIRATION RATE: 18 BRPM

## 2025-05-31 DIAGNOSIS — S06.0X0D CONCUSSION WITHOUT LOSS OF CONSCIOUSNESS, SUBSEQUENT ENCOUNTER: Primary | ICD-10-CM
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
## 2025-06-05 ENCOUNTER — OCCUPATIONAL MEDICINE (OUTPATIENT)
Dept: OCCUPATIONAL MEDICINE | Facility: CLINIC | Age: 40
End: 2025-06-05
Payer: COMMERCIAL

## 2025-06-05 VITALS
RESPIRATION RATE: 16 BRPM | WEIGHT: 101 LBS | TEMPERATURE: 97.4 F | BODY MASS INDEX: 17.89 KG/M2 | HEART RATE: 72 BPM | HEIGHT: 63 IN | DIASTOLIC BLOOD PRESSURE: 80 MMHG | OXYGEN SATURATION: 100 % | SYSTOLIC BLOOD PRESSURE: 124 MMHG
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
## 2025-06-05 DIAGNOSIS — S09.90XD CLOSED HEAD INJURY, SUBSEQUENT ENCOUNTER: ICD-10-CM

## 2025-06-05 DIAGNOSIS — S00.83XD CONTUSION OF FACE, SUBSEQUENT ENCOUNTER: Primary | ICD-10-CM

## 2025-06-05 PROCEDURE — 99213 OFFICE O/P EST LOW 20 MIN: CPT | Performed by: NURSE PRACTITIONER

## 2025-06-05 ASSESSMENT — ENCOUNTER SYMPTOMS
CONSTITUTIONAL NEGATIVE: 1
PHOTOPHOBIA: 0
CARDIOVASCULAR NEGATIVE: 1
FOCAL WEAKNESS: 0
PSYCHIATRIC NEGATIVE: 1
TINGLING: 0
MEMORY LOSS: 0
DIZZINESS: 1
EYE PAIN: 0
NAUSEA: 0
SENSORY CHANGE: 0
HEADACHES: 0
MYALGIAS: 1
SINUS PAIN: 0
RESPIRATORY NEGATIVE: 1
VOMITING: 0
DOUBLE VISION: 0
INSOMNIA: 0
NECK PAIN: 0
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
## 2025-06-16 ENCOUNTER — TELEPHONE (OUTPATIENT)
Dept: OCCUPATIONAL MEDICINE | Facility: CLINIC | Age: 40
End: 2025-06-16

## 2025-06-16 NOTE — TELEPHONE ENCOUNTER
Patient did not arrive to the appointment scheduled for today. A voicemail with instructions on how to reschedule was left on patient's mailbox. [phone]